# Patient Record
Sex: FEMALE | Race: WHITE
[De-identification: names, ages, dates, MRNs, and addresses within clinical notes are randomized per-mention and may not be internally consistent; named-entity substitution may affect disease eponyms.]

---

## 2017-03-27 ENCOUNTER — HOSPITAL ENCOUNTER (OUTPATIENT)
Dept: HOSPITAL 62 - OD | Age: 27
End: 2017-03-27
Attending: INTERNAL MEDICINE
Payer: COMMERCIAL

## 2017-03-27 DIAGNOSIS — E66.01: Primary | ICD-10-CM

## 2017-03-27 DIAGNOSIS — M06.9: ICD-10-CM

## 2017-03-27 DIAGNOSIS — Z79.899: ICD-10-CM

## 2017-03-27 LAB
ALBUMIN SERPL-MCNC: 3.9 G/DL (ref 3.5–5)
ALP SERPL-CCNC: 77 U/L (ref 38–126)
ALT SERPL-CCNC: 27 U/L (ref 9–52)
ANION GAP SERPL CALC-SCNC: 14 MMOL/L (ref 5–19)
AST SERPL-CCNC: 17 U/L (ref 14–36)
BASOPHILS # BLD AUTO: 0 10^3/UL (ref 0–0.2)
BASOPHILS NFR BLD AUTO: 0.2 % (ref 0–2)
BILIRUB DIRECT SERPL-MCNC: 0.3 MG/DL (ref 0–0.4)
BILIRUB SERPL-MCNC: 0.4 MG/DL (ref 0.2–1.3)
BUN SERPL-MCNC: 17 MG/DL (ref 7–20)
CALCIUM: 9.6 MG/DL (ref 8.4–10.2)
CHLORIDE SERPL-SCNC: 107 MMOL/L (ref 98–107)
CHOLEST SERPL-MCNC: 159.23 MG/DL (ref 0–200)
CO2 SERPL-SCNC: 26 MMOL/L (ref 22–30)
CREAT SERPL-MCNC: 0.65 MG/DL (ref 0.52–1.25)
DIRECT HDL: 47 MG/DL (ref 40–?)
EOSINOPHIL # BLD AUTO: 0 10^3/UL (ref 0–0.6)
EOSINOPHIL NFR BLD AUTO: 0.2 % (ref 0–6)
ERYTHROCYTE [DISTWIDTH] IN BLOOD BY AUTOMATED COUNT: 18.1 % (ref 11.5–14)
GLUCOSE SERPL-MCNC: 117 MG/DL (ref 75–110)
HCT VFR BLD CALC: 36.4 % (ref 36–47)
HGB BLD-MCNC: 11.8 G/DL (ref 12–15.5)
HGB HCT DIFFERENCE: -1
LDLC SERPL DIRECT ASSAY-MCNC: 81 MG/DL (ref ?–100)
LYMPHOCYTES # BLD AUTO: 1.8 10^3/UL (ref 0.5–4.7)
LYMPHOCYTES NFR BLD AUTO: 15.6 % (ref 13–45)
MCH RBC QN AUTO: 27.6 PG (ref 27–33.4)
MCHC RBC AUTO-ENTMCNC: 32.3 G/DL (ref 32–36)
MCV RBC AUTO: 85 FL (ref 80–97)
MONOCYTES # BLD AUTO: 0.5 10^3/UL (ref 0.1–1.4)
MONOCYTES NFR BLD AUTO: 4.3 % (ref 3–13)
NEUTROPHILS # BLD AUTO: 9 10^3/UL (ref 1.7–8.2)
NEUTS SEG NFR BLD AUTO: 79.7 % (ref 42–78)
POTASSIUM SERPL-SCNC: 4.9 MMOL/L (ref 3.6–5)
PROT SERPL-MCNC: 7.2 G/DL (ref 6.3–8.2)
RBC # BLD AUTO: 4.26 10^6/UL (ref 3.72–5.28)
SODIUM SERPL-SCNC: 146.9 MMOL/L (ref 137–145)
TRIGL SERPL-MCNC: 100 MG/DL (ref ?–150)
VLDLC SERPL CALC-MCNC: 20 MG/DL (ref 10–31)
WBC # BLD AUTO: 11.3 10^3/UL (ref 4–10.5)

## 2017-03-27 PROCEDURE — 80061 LIPID PANEL: CPT

## 2017-03-27 PROCEDURE — 84443 ASSAY THYROID STIM HORMONE: CPT

## 2017-03-27 PROCEDURE — 36415 COLL VENOUS BLD VENIPUNCTURE: CPT

## 2017-03-27 PROCEDURE — 80053 COMPREHEN METABOLIC PANEL: CPT

## 2017-03-27 PROCEDURE — 85025 COMPLETE CBC W/AUTO DIFF WBC: CPT

## 2017-03-27 PROCEDURE — 83036 HEMOGLOBIN GLYCOSYLATED A1C: CPT

## 2017-05-10 ENCOUNTER — HOSPITAL ENCOUNTER (EMERGENCY)
Dept: HOSPITAL 62 - ER | Age: 27
Discharge: HOME | End: 2017-05-10
Payer: MEDICAID

## 2017-05-10 VITALS — DIASTOLIC BLOOD PRESSURE: 117 MMHG | SYSTOLIC BLOOD PRESSURE: 151 MMHG

## 2017-05-10 DIAGNOSIS — E66.9: ICD-10-CM

## 2017-05-10 DIAGNOSIS — M06.9: Primary | ICD-10-CM

## 2017-05-10 DIAGNOSIS — Z59.9: ICD-10-CM

## 2017-05-10 PROCEDURE — 96374 THER/PROPH/DIAG INJ IV PUSH: CPT

## 2017-05-10 PROCEDURE — 99283 EMERGENCY DEPT VISIT LOW MDM: CPT

## 2017-05-10 SDOH — ECONOMIC STABILITY - INCOME SECURITY: PROBLEM RELATED TO HOUSING AND ECONOMIC CIRCUMSTANCES, UNSPECIFIED: Z59.9

## 2017-05-10 NOTE — ER DOCUMENT REPORT
ED General





- General


Chief Complaint: Pain All Over


Stated Complaint: GENERAL PAIN


Time Seen by Provider: 05/10/17 15:51


Mode of Arrival: Wheelchair


Information source: Patient


Notes: 


26-year-old female with history of rheumatoid arthritis reports she hasn't seen 

her rheumatologist in a month due to financial issues and ran out of her 

prednisone approximately 2 weeks ago.  She has had increasing pain all joints 

since then to the point that now she is almost nonambulatory.  She denies fever

, chills, nausea, vomiting, cough, shortness breath, or specific chest 

abdominal or back pain.


Physical Exam:





General: Obese pleasant female alert no respiratory distress





HEENT: Normocephalic. Atraumatic. PERRLA. Extraocular movements intact. 

Oropharynx clear.





Neck: Supple. Non-tender.





Respiratory: No respiratory distress. Clear and equal breath sounds bilaterally.





Cardiovascular: Regular rate and rhythm. 





Abdominal: Normal Inspection. Soft, non-tender. No distension. Normal Bowel 

Sounds. 





Back: Non-tender. No deformity or step off.





All extremities warm to plus pulses no cyanosis no Homans sign bilaterally 

diffuse discomfort with range of motion





Neurological: Speech clear mentation normal





Psychological: Normal affect. Normal Mood. 





Skin: Warm. Dry. Normal color.


TRAVEL OUTSIDE OF THE [place] IN LAST 30 DAYS: No





- Related Data


Allergies/Adverse Reactions: 


 





No Known Allergies Allergy (Verified 05/10/17 15:39)


 











Past Medical History





- Social History


Smoking Status: Unknown if Ever Smoked


Family History: Reviewed & Not Pertinent


Patient has suicidal ideation: No


Patient has homicidal ideation: No


Renal/ Medical History: Denies: Hx Peritoneal Dialysis


Musculoskeltal Medical History: Reports Hx Arthritis - RA


Past Surgical History: Reports: Hx Orthopedic Surgery - fractured left hip





- Immunizations


Hx Diphtheria, Pertussis, Tetanus Vaccination: No





Review of Systems





- Review of Systems


Constitutional: denies: Chills, Fever


EENT: denies: Ear pain, Nose pain, Throat pain


Cardiovascular: denies: Chest pain, Dizziness


Respiratory: denies: Cough, Short of breath


Female Genitourinary: denies: Pregnant


Musculoskeletal: denies: Back pain


Skin: denies: Rash


Hematologic/Lymphatic: denies: Swollen glands


Neurological/Psychological: denies: Weakness, Numbness





Physical Exam





- Vital signs


Vitals: 





 











Temp Pulse Resp BP Pulse Ox


 


 98.2 F   121 H  24 H  151/117 H  98 


 


 05/10/17 15:09  05/10/17 15:09  05/10/17 15:09  05/10/17 15:09  05/10/17 15:09














Course





- Re-evaluation


Re-evalutation: 





05/10/17 16:00


Patient has worsening of her rheumatoid arthritis symptoms likely due to coming 

off prednisone 2 weeks ago.  She will receive one dose of IV steroids here and 

will refill prednisone for 2 weeks.  I have encouraged her to seek referral a 

[facility] has they may have options other than prednisone that may be 

beneficial for her rheumatoid arthritis





- Vital Signs


Vital signs: 





 











Temp Pulse Resp BP Pulse Ox


 


 98.2 F   121 H  24 H  151/117 H  98 


 


 05/10/17 15:09  05/10/17 15:09  05/10/17 15:09  05/10/17 15:09  05/10/17 15:09














Discharge





- Discharge


Clinical Impression: 


Rheumatoid arthritis


Qualifiers:


 Rheumatoid arthritis location: vertebra Rheumatoid factor presence: 

unspecified presence Qualified Code(s): M06.9 - Rheumatoid arthritis, 

unspecified





Condition: Stable


Disposition: HOME, SELF-CARE


Additional Instructions: 


Rheumatoid Arthritis





     Your symptoms are due to rheumatoid arthritis. This is an inflammation of 

the joints caused by your body's immune system. We don't know why rheumatoid 

arthritis occurs. 


      The hands, feet, and knees are most often involved. Joints become swollen

, stiff, and tender. Rheumatoid arthritis often has other symptoms such as low-

grade fever, fatigue, and anemia (low red blood cell count).


     Arthritis is treated with antiinflammatory medication. We usually start 

with a non-steroid medicine such as ibuprofen or one of its relatives. The 

symptoms may spontaneously get better or worse from time to time. There is no 

permanent cure. Stronger antiinflammatory medicines may be added if the 

symptoms worsen. This can include cortisone medication, gold shots, immune 

suppressants, and other types of antiinflammatory medicine.


     Local warmth may be helpful. Move the involved joints through the full 

range of motion daily.  Mild exercise is usually still possible for most 

persons with arthritis (ask your physician).  Swimming provides good exercise 

without damaging the joints.


     Contact the physician if you are worsening in any way.





Prescriptions: 


Prednisone [Deltasone 10 mg Tablet] 10 mg PO DAILY #20 tablet


Referrals: 


[facility] [Provider Group] - Follow up in 1 week

## 2018-03-02 ENCOUNTER — HOSPITAL ENCOUNTER (OUTPATIENT)
Dept: HOSPITAL 62 - OD | Age: 28
End: 2018-03-02
Attending: PHYSICIAN ASSISTANT
Payer: COMMERCIAL

## 2018-03-02 DIAGNOSIS — R00.0: Primary | ICD-10-CM

## 2018-03-02 PROCEDURE — 93005 ELECTROCARDIOGRAM TRACING: CPT

## 2018-03-02 PROCEDURE — 93010 ELECTROCARDIOGRAM REPORT: CPT

## 2018-03-02 NOTE — EKG REPORT
SEVERITY:- OTHERWISE NORMAL ECG -

SINUS TACHYCARDIA

:

Confirmed by: Finesse Balderrama MD 02-Mar-2018 14:36:42